# Patient Record
Sex: FEMALE | Race: WHITE
[De-identification: names, ages, dates, MRNs, and addresses within clinical notes are randomized per-mention and may not be internally consistent; named-entity substitution may affect disease eponyms.]

---

## 2019-01-25 ENCOUNTER — HOSPITAL ENCOUNTER (OUTPATIENT)
Dept: HOSPITAL 11 - JP.SDS | Age: 54
End: 2019-01-25
Attending: SURGERY
Payer: COMMERCIAL

## 2019-01-25 DIAGNOSIS — F17.200: ICD-10-CM

## 2019-01-25 DIAGNOSIS — Z12.11: Primary | ICD-10-CM

## 2019-01-25 DIAGNOSIS — Z86.711: ICD-10-CM

## 2019-01-25 DIAGNOSIS — K21.9: ICD-10-CM

## 2019-01-25 DIAGNOSIS — F32.9: ICD-10-CM

## 2019-01-25 PROCEDURE — 45378 DIAGNOSTIC COLONOSCOPY: CPT

## 2019-01-25 NOTE — OR
DATE OF PROCEDURE:  01/25/2019

 

PROCEDURE:  Colonoscopy.

 

FINDINGS:  Normal colonoscopy.

 

PREOPERATIVE DIAGNOSIS:  Screening colonoscopy.

 

POSTOPERATIVE DIAGNOSIS:  Screening colonoscopy.

 

RISKS:  Risks, benefits, alternatives, and limitations including, but not limited to

infection, bleeding, and perforation were explained to the patient who wished to proceed.

 

PROCEDURE IN DETAIL:  The patient was placed in left lateral decubitus position.  Digital

rectal exam was performed without abnormality.  The scope was introduced and advanced

atraumatically to the ileocecal valve.  Mild tortuosity noted in the area of the valve of

[place]rig[place].

 

The scope was brought back to the ascending, transverse, descending colon, and retroflexed.

No evidence of polyps.

 

No old or new blood.  No colitis.  No diverticulosis.  No abnormalities on retroflex.

 

The patient tolerated the procedure well.

 

 

 

 

Robert Rucker MD

DD:  01/25/2019 09:35:59

DT:  01/25/2019 11:09:16

Job #:  [id number]